# Patient Record
Sex: FEMALE | Race: WHITE | NOT HISPANIC OR LATINO | Employment: FULL TIME | URBAN - NONMETROPOLITAN AREA
[De-identification: names, ages, dates, MRNs, and addresses within clinical notes are randomized per-mention and may not be internally consistent; named-entity substitution may affect disease eponyms.]

---

## 2018-11-07 ENCOUNTER — HOSPITAL ENCOUNTER (OUTPATIENT)
Dept: GENERAL RADIOLOGY | Facility: HOSPITAL | Age: 48
Discharge: HOME | End: 2018-11-07
Admitting: NURSE PRACTITIONER

## 2018-11-07 ENCOUNTER — HOSPITAL ENCOUNTER (EMERGENCY)
Facility: HOSPITAL | Age: 48
End: 2018-11-07

## 2018-11-07 ENCOUNTER — TRANSCRIBE ORDERS (OUTPATIENT)
Dept: ADMINISTRATIVE | Facility: HOSPITAL | Age: 48
End: 2018-11-07

## 2018-11-07 DIAGNOSIS — M79.671 RIGHT FOOT PAIN: ICD-10-CM

## 2018-11-07 DIAGNOSIS — M79.671 RIGHT FOOT PAIN: Primary | ICD-10-CM

## 2018-11-07 PROCEDURE — 73630 X-RAY EXAM OF FOOT: CPT

## 2018-11-07 PROCEDURE — 73630 X-RAY EXAM OF FOOT: CPT | Performed by: RADIOLOGY

## 2020-09-22 ENCOUNTER — TRANSCRIBE ORDERS (OUTPATIENT)
Dept: ADMINISTRATIVE | Facility: HOSPITAL | Age: 50
End: 2020-09-22

## 2020-09-22 DIAGNOSIS — Z20.828 EXPOSURE TO SARS-ASSOCIATED CORONAVIRUS: Primary | ICD-10-CM

## 2020-09-24 ENCOUNTER — TRANSCRIBE ORDERS (OUTPATIENT)
Dept: ADMINISTRATIVE | Facility: HOSPITAL | Age: 50
End: 2020-09-24

## 2020-09-24 DIAGNOSIS — R60.0 LOCALIZED EDEMA: Primary | ICD-10-CM

## 2020-10-02 ENCOUNTER — HOSPITAL ENCOUNTER (OUTPATIENT)
Dept: CARDIOLOGY | Facility: HOSPITAL | Age: 50
Discharge: HOME | End: 2020-10-02
Admitting: NURSE PRACTITIONER

## 2020-10-02 DIAGNOSIS — R60.0 LOCALIZED EDEMA: ICD-10-CM

## 2020-10-02 PROCEDURE — 93306 TTE W/DOPPLER COMPLETE: CPT

## 2020-10-02 PROCEDURE — 93306 TTE W/DOPPLER COMPLETE: CPT | Performed by: INTERNAL MEDICINE

## 2020-10-03 LAB
BH CV ECHO MEAS - % IVS THICK: 17.7 %
BH CV ECHO MEAS - % LVPW THICK: 19.6 %
BH CV ECHO MEAS - ACS: 1.8 CM
BH CV ECHO MEAS - AO MAX PG: 10.8 MMHG
BH CV ECHO MEAS - AO MEAN PG: 7 MMHG
BH CV ECHO MEAS - AO ROOT AREA (BSA CORRECTED): 1.4
BH CV ECHO MEAS - AO ROOT AREA: 8 CM^2
BH CV ECHO MEAS - AO ROOT DIAM: 3.2 CM
BH CV ECHO MEAS - AO V2 MAX: 164 CM/SEC
BH CV ECHO MEAS - AO V2 MEAN: 127 CM/SEC
BH CV ECHO MEAS - AO V2 VTI: 39.5 CM
BH CV ECHO MEAS - BSA(HAYCOCK): 2.5 M^2
BH CV ECHO MEAS - BSA: 2.3 M^2
BH CV ECHO MEAS - BZI_BMI: 48.7 KILOGRAMS/M^2
BH CV ECHO MEAS - BZI_METRIC_HEIGHT: 162.6 CM
BH CV ECHO MEAS - BZI_METRIC_WEIGHT: 128.8 KG
BH CV ECHO MEAS - EDV(CUBED): 107.5 ML
BH CV ECHO MEAS - EDV(MOD-SP4): 49.2 ML
BH CV ECHO MEAS - EDV(TEICH): 105.2 ML
BH CV ECHO MEAS - EF(CUBED): 65.2 %
BH CV ECHO MEAS - EF(MOD-SP4): 64.4 %
BH CV ECHO MEAS - EF(TEICH): 56.6 %
BH CV ECHO MEAS - ESV(CUBED): 37.4 ML
BH CV ECHO MEAS - ESV(MOD-SP4): 17.5 ML
BH CV ECHO MEAS - ESV(TEICH): 45.6 ML
BH CV ECHO MEAS - FS: 29.7 %
BH CV ECHO MEAS - IVS/LVPW: 0.99
BH CV ECHO MEAS - IVSD: 1.4 CM
BH CV ECHO MEAS - IVSS: 1.7 CM
BH CV ECHO MEAS - LA DIMENSION: 4 CM
BH CV ECHO MEAS - LA/AO: 1.3
BH CV ECHO MEAS - LV DIASTOLIC VOL/BSA (35-75): 21.7 ML/M^2
BH CV ECHO MEAS - LV MASS(C)D: 275.7 GRAMS
BH CV ECHO MEAS - LV MASS(C)DI: 121.4 GRAMS/M^2
BH CV ECHO MEAS - LV MASS(C)S: 221.4 GRAMS
BH CV ECHO MEAS - LV MASS(C)SI: 97.5 GRAMS/M^2
BH CV ECHO MEAS - LV SYSTOLIC VOL/BSA (12-30): 7.7 ML/M^2
BH CV ECHO MEAS - LVIDD: 4.8 CM
BH CV ECHO MEAS - LVIDS: 3.3 CM
BH CV ECHO MEAS - LVLD AP4: 7.2 CM
BH CV ECHO MEAS - LVLS AP4: 5.9 CM
BH CV ECHO MEAS - LVOT AREA (M): 3.8 CM^2
BH CV ECHO MEAS - LVOT AREA: 3.8 CM^2
BH CV ECHO MEAS - LVOT DIAM: 2.2 CM
BH CV ECHO MEAS - LVPWD: 1.4 CM
BH CV ECHO MEAS - LVPWS: 1.7 CM
BH CV ECHO MEAS - MV A MAX VEL: 50.3 CM/SEC
BH CV ECHO MEAS - MV E MAX VEL: 84.2 CM/SEC
BH CV ECHO MEAS - MV E/A: 1.7
BH CV ECHO MEAS - PA ACC TIME: 0.1 SEC
BH CV ECHO MEAS - PA PR(ACCEL): 36.3 MMHG
BH CV ECHO MEAS - RAP SYSTOLE: 10 MMHG
BH CV ECHO MEAS - RVSP: 45.3 MMHG
BH CV ECHO MEAS - SI(AO): 139.9 ML/M^2
BH CV ECHO MEAS - SI(CUBED): 30.9 ML/M^2
BH CV ECHO MEAS - SI(MOD-SP4): 14 ML/M^2
BH CV ECHO MEAS - SI(TEICH): 26.2 ML/M^2
BH CV ECHO MEAS - SV(AO): 317.7 ML
BH CV ECHO MEAS - SV(CUBED): 70.1 ML
BH CV ECHO MEAS - SV(MOD-SP4): 31.7 ML
BH CV ECHO MEAS - SV(TEICH): 59.6 ML
BH CV ECHO MEAS - TR MAX VEL: 296.5 CM/SEC
LV EF 2D ECHO EST: 65 %
MAXIMAL PREDICTED HEART RATE: 171 BPM
STRESS TARGET HR: 145 BPM

## 2021-03-18 ENCOUNTER — BULK ORDERING (OUTPATIENT)
Dept: CASE MANAGEMENT | Facility: OTHER | Age: 51
End: 2021-03-18

## 2021-03-18 DIAGNOSIS — Z23 IMMUNIZATION DUE: ICD-10-CM

## 2021-04-22 ENCOUNTER — HOSPITAL ENCOUNTER (OUTPATIENT)
Dept: ULTRASOUND IMAGING | Facility: HOSPITAL | Age: 51
Discharge: HOME | End: 2021-04-22

## 2021-04-22 ENCOUNTER — HOSPITAL ENCOUNTER (OUTPATIENT)
Dept: MAMMOGRAPHY | Facility: HOSPITAL | Age: 51
Discharge: HOME | End: 2021-04-22

## 2021-04-22 DIAGNOSIS — N63.0 LUMP OR MASS IN BREAST: ICD-10-CM

## 2021-04-22 PROCEDURE — 76642 ULTRASOUND BREAST LIMITED: CPT

## 2021-04-22 PROCEDURE — G0279 TOMOSYNTHESIS, MAMMO: HCPCS

## 2021-04-22 PROCEDURE — 77065 DX MAMMO INCL CAD UNI: CPT

## 2021-04-22 PROCEDURE — 77061 BREAST TOMOSYNTHESIS UNI: CPT | Performed by: RADIOLOGY

## 2021-04-22 PROCEDURE — 76642 ULTRASOUND BREAST LIMITED: CPT | Performed by: RADIOLOGY

## 2021-04-22 PROCEDURE — 77065 DX MAMMO INCL CAD UNI: CPT | Performed by: RADIOLOGY

## 2022-08-16 ENCOUNTER — OFFICE VISIT (OUTPATIENT)
Dept: PULMONOLOGY | Facility: CLINIC | Age: 52
End: 2022-08-16

## 2022-08-16 VITALS
HEIGHT: 64 IN | HEART RATE: 86 BPM | DIASTOLIC BLOOD PRESSURE: 86 MMHG | BODY MASS INDEX: 50.02 KG/M2 | TEMPERATURE: 97.8 F | SYSTOLIC BLOOD PRESSURE: 138 MMHG | OXYGEN SATURATION: 97 % | WEIGHT: 293 LBS

## 2022-08-16 DIAGNOSIS — G47.33 OSA (OBSTRUCTIVE SLEEP APNEA): Primary | ICD-10-CM

## 2022-08-16 DIAGNOSIS — E66.01 MORBID OBESITY WITH BMI OF 50.0-59.9, ADULT: ICD-10-CM

## 2022-08-16 PROCEDURE — 99203 OFFICE O/P NEW LOW 30 MIN: CPT | Performed by: NURSE PRACTITIONER

## 2022-08-16 RX ORDER — MELATONIN
COMMUNITY
Start: 2013-05-22

## 2022-08-16 RX ORDER — CYANOCOBALAMIN 1000 UG/ML
INJECTION, SOLUTION INTRAMUSCULAR; SUBCUTANEOUS
COMMUNITY
Start: 2022-05-26

## 2022-08-16 RX ORDER — CELECOXIB 200 MG/1
CAPSULE ORAL
COMMUNITY
Start: 2022-05-26

## 2022-08-16 RX ORDER — TIRZEPATIDE 2.5 MG/.5ML
INJECTION, SOLUTION SUBCUTANEOUS
COMMUNITY
Start: 2022-08-05

## 2022-08-16 RX ORDER — ERGOCALCIFEROL 1.25 MG/1
CAPSULE ORAL
COMMUNITY
Start: 2022-05-26

## 2022-08-16 RX ORDER — MONTELUKAST SODIUM 10 MG/1
10 TABLET ORAL NIGHTLY
COMMUNITY

## 2022-08-16 NOTE — PROGRESS NOTES
"Chief Complaint  Sleep Apnea    Subjective        Augustina Oviedo presents to [facility] PULMONARY & CRITICAL CARE MEDICINE  History of Present Illness     Ms. Oviedo is a 51 year old female with a medical history significant for diabetes.    She presents today for evaluation of sleep apnea.  She tells me that she snores at night and has had witnessed episodes of apnea.  She reports that she wakes up several times a night coughing and can't catch her breath.  She reports feeling unrested in the morning and morning headaches.  She also complains of daytime fatigue and sleepiness.      Objective   Vital Signs:  /86   Pulse 86   Temp 97.8 °F (36.6 °C) (Temporal)   Ht 162.6 cm (64\")   Wt (!) 140 kg (309 lb)   SpO2 97%   BMI 53.04 kg/m²   Estimated body mass index is 53.04 kg/m² as calculated from the following:    Height as of this encounter: 162.6 cm (64\").    Weight as of this encounter: 140 kg (309 lb).    Class 3 Severe Obesity (BMI >=40). Obesity-related health conditions include the following: diabetes mellitus. Obesity is unchanged. BMI is is above average; BMI management plan is completed. We discussed portion control and increasing exercise.      Physical Exam     GENERAL APPEARANCE: Well developed, obese, alert and cooperative, and appears to be in no acute distress.    HEAD: normocephalic. Atraumatic.    EYES: PERRL, EOMI. Vision is grossly intact.    THROAT: Oral cavity and pharynx normal. No inflammation, swelling, exudate, or lesions.     NECK: Neck supple.  No thyromegaly.    CARDIAC: Normal S1 and S2. No S3, S4 or murmurs. Rhythm is regular.     RESPIRATORY:Bilateral air entry positive. Bilateral diminished breath sounds. No wheezing, crackles or rhonchi noted.    GI: Positive bowel sounds. Soft, nondistended, nontender.     MUSCULOSKELETAL: No significant deformity or joint abnormality. No edema. Peripheral pulses intact. No varicosities.    NEUROLOGICAL: Strength and " sensation symmetric and intact throughout.     PSYCHIATRIC: The mental examination revealed the patient was oriented to person, place, and time.     Result Review :  The following data was reviewed by: AMAIRANI Bose on 08/16/2022:               Assessment and Plan   Diagnoses and all orders for this visit:    1. DAVID (obstructive sleep apnea) (Primary)  -     Ambulatory Referral to Sleep Lab    2. Morbid obesity with BMI of 50.0-59.9, adult (HCC)         Ordered in lab sleep study.      The patient was extensively educated on the consequences of untreated obstructive sleep apnea namely cardiovascular/metabolic disorder, neurocognitive deficit, daytime sleepiness, motor vehicle accidents, depression, mood disorders and reduced quality of life.  At the end of conversation, the patient voices understanding of the disease process and treatment modality.  Patient also understands the risk of untreated obstructive sleep apnea and benefit benefits of the treatment.    Counseling time was greater than 10 minutes.      Follow Up   Return in about 3 months (around 11/16/2022).  Patient was given instructions and counseling regarding her condition or for health maintenance advice. Please see specific information pulled into the AVS if appropriate.

## 2022-08-19 ENCOUNTER — PATIENT ROUNDING (BHMG ONLY) (OUTPATIENT)
Dept: PULMONOLOGY | Facility: CLINIC | Age: 52
End: 2022-08-19

## 2022-08-19 NOTE — PROGRESS NOTES
August 19, 2022    Hello, may I speak with Augustina Oviedo?    My name is Rebekah      I am  with [facility] PULMONARY & CRITICAL CARE MEDICINE  [address]  [place] [address] [phone].    Before we get started may I verify your date of birth? 1970    I am calling to officially welcome you to our practice and ask about your recent visit. Is this a good time to talk? yes    Tell me about your visit with us. What things went well?  I had a good visit, I cant complain.       We're always looking for ways to make our patients' experiences even better. Do you have recommendations on ways we may improve?  no    Overall were you satisfied with your first visit to our practice? yes       I appreciate you taking the time to speak with me today. Is there anything else I can do for you? no      Thank you, and have a great day.

## 2022-12-13 ENCOUNTER — TRANSCRIBE ORDERS (OUTPATIENT)
Dept: ADMINISTRATIVE | Facility: HOSPITAL | Age: 52
End: 2022-12-13

## 2022-12-13 DIAGNOSIS — Z12.31 VISIT FOR SCREENING MAMMOGRAM: Primary | ICD-10-CM

## 2022-12-16 ENCOUNTER — HOSPITAL ENCOUNTER (OUTPATIENT)
Dept: MAMMOGRAPHY | Facility: HOSPITAL | Age: 52
Discharge: HOME | End: 2022-12-16
Admitting: NURSE PRACTITIONER

## 2022-12-16 DIAGNOSIS — Z12.31 VISIT FOR SCREENING MAMMOGRAM: ICD-10-CM

## 2022-12-16 PROCEDURE — 77063 BREAST TOMOSYNTHESIS BI: CPT

## 2022-12-16 PROCEDURE — 77063 BREAST TOMOSYNTHESIS BI: CPT | Performed by: RADIOLOGY

## 2022-12-16 PROCEDURE — 77067 SCR MAMMO BI INCL CAD: CPT | Performed by: RADIOLOGY

## 2022-12-16 PROCEDURE — 77067 SCR MAMMO BI INCL CAD: CPT

## 2023-04-25 ENCOUNTER — TRANSCRIBE ORDERS (OUTPATIENT)
Dept: ADMINISTRATIVE | Facility: HOSPITAL | Age: 53
End: 2023-04-25
Payer: COMMERCIAL

## 2023-04-25 DIAGNOSIS — Z12.31 SCREENING MAMMOGRAM, ENCOUNTER FOR: Primary | ICD-10-CM

## 2024-01-12 RX ORDER — PANTOPRAZOLE SODIUM 40 MG/1
40 TABLET, DELAYED RELEASE ORAL DAILY
Qty: 30 TABLET | Refills: 5 | Status: CANCELLED | OUTPATIENT
Start: 2023-12-21

## 2024-08-20 RX ORDER — ERGOCALCIFEROL 1.25 MG/1
50000 CAPSULE ORAL WEEKLY
Qty: 12 CAPSULE | Refills: 2 | Status: CANCELLED | OUTPATIENT
Start: 2024-08-20